# Patient Record
Sex: FEMALE | Race: WHITE | Employment: STUDENT | URBAN - METROPOLITAN AREA
[De-identification: names, ages, dates, MRNs, and addresses within clinical notes are randomized per-mention and may not be internally consistent; named-entity substitution may affect disease eponyms.]

---
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
## 2025-07-10 PROBLEM — L70.0 ACNE VULGARIS: Status: ACTIVE | Noted: 2022-06-27

## 2025-07-10 NOTE — PROGRESS NOTES
Chief Complaint  Patient ID: Harmony Lombardi is a 20 y.o. female who presents for No chief complaint on file..              History of Present Illness  1.  Physical exam.  Immunizations: Tdap last done 2017; UTD on HPV  Patient overall doing well.  She is previously a patient of Dr. Land;s    2. Acne vulgaris  Symptoms are well controlled on spironolactone and Lo Loestrin      Review of Systems  All pertinent positive symptoms are included in the history of present illness.    All other systems have been reviewed and are negative and noncontributory to this patient's current ailments.    Past Medical History  She has a past medical history of Other specified health status.    Surgical History  She has a past surgical history that includes Other surgical history (06/20/2019).     Social History  She reports that she has never smoked. She has never used smokeless tobacco. She reports that she does not drink alcohol and does not use drugs.    Family History[1]  Medications Prior to Visit[2]    Allergies  Patient has no known allergies.    Immunization History   Administered Date(s) Administered    DTaP HepB IPV combined vaccine, pedatric (PEDIARIX) 2005    DTaP, Unspecified 2005, 2005, 11/21/2006, 07/21/2009    Flu vaccine (IIV4), preservative free *Check age/dose* 10/29/2016    Flu vaccine, quadrivalent, no egg protein, age 6 month or greater (FLUCELVAX) 09/19/2020    HPV, Quadrivalent 06/20/2019, 07/22/2019    Hep A, Unspecified 06/06/2013    Hepatitis B vaccine, 19 yrs and under (RECOMBIVAX, ENGERIX) 08/19/2008    Hib (HbOC) 2005, 2005, 2005, 05/20/2006    Influenza, Unspecified 10/20/2010    Influenza, injectable, MDCK, quadrivalent 11/03/2019    Influenza, live, intranasal 11/29/2006    Influenza, seasonal, injectable 09/12/2017, 10/20/2018, 10/11/2021, 10/12/2022    MMR vaccine, subcutaneous (MMR II) 05/20/2006, 07/21/2009    Meningococcal ACWY-D (Menactra) 4-valent conjugate  vaccine 06/07/2017, 06/20/2022    Pfizer Purple Cap SARS-CoV-2 06/07/2021, 06/28/2021    Pneumococcal Conjugate PCV 7 2005, 2005, 02/11/2006, 05/20/2006    Poliovirus vaccine, subcutaneous (IPOL) 2005, 11/21/2006, 07/21/2009    Tdap vaccine, age 7 year and older (BOOSTRIX, ADACEL) 06/07/2017    Varicella vaccine, subcutaneous (VARIVAX) 05/20/2006, 08/19/2008, 07/21/2009     Objective   Visit Vitals  Smoking Status Never        BP Readings from Last 3 Encounters:   07/25/24 116/66   02/20/23 116/60 (71%, Z = 0.55 /  24%, Z = -0.71)*   02/13/23 118/76     *BP percentiles are based on the 2017 AAP Clinical Practice Guideline for girls      Wt Readings from Last 3 Encounters:   07/25/24 61.2 kg (135 lb) (64%, Z= 0.36)*   02/20/23 54 kg (41%, Z= -0.23)*   02/13/23 54 kg (41%, Z= -0.23)*     * Growth percentiles are based on CDC (Girls, 2-20 Years) data.       Relevant Results  No visits with results within 1 Month(s) from this visit.   Latest known visit with results is:   Lab on 07/25/2024   Component Date Value    Hepatitis B Surface AB 07/25/2024 46.4 (H)     T-SPOT. TB Interpretation 07/25/2024 Negative     Panel A Spot Count 07/25/2024 0     Panel B Spot Count 07/25/2024 0     NIL(NEG) Control Spot Co* 07/25/2024 Passed     POS Control Spot Count 07/25/2024 Passed      The ASCVD Risk score (Domingo DK, et al., 2019) failed to calculate for the following reasons:    The 2019 ASCVD risk score is only valid for ages 40 to 79    Physical Exam  CONSTITUTIONAL - well nourished, well developed, looks like stated age, in no acute distress, not ill-appearing, and not tired appearing  SKIN - normal skin color and pigmentation, normal skin turgor without rash, lesions, or nodules visualized  HEAD - no trauma, normocephalic  EYES - pupils are equal and reactive to light, extraocular muscles are intact, and normal external exam  NECK - supple without rigidity, no neck mass was observed, no thyromegaly or thyroid  nodules  CHEST - clear to auscultation, no wheezing, no crackles and no rales, good effort  CARDIAC - regular rate and regular rhythm, no skipped beats, no murmur  ABDOMEN - no organomegaly, soft, nontender, nondistended, normal bowel sound  EXTREMITIES - no obvious or evident edema, no obvious or evident deformities  NEUROLOGICAL - lalert, oriented and no focal signs  PSYCHIATRIC - alert, pleasant and cordial, age-appropriate  IMMUNOLOGIC - no cervical lymphadenopathy    Assessment and Plan  Problem List Items Addressed This Visit    None         [1]   Family History  Problem Relation Name Age of Onset    No Known Problems Mother      No Known Problems Father     [2]   Outpatient Medications Prior to Visit   Medication Sig Dispense Refill    norethindrone-e.estradioL-iron (Lo Loestrin Fe) 1 mg-10 mcg (24)/10 mcg (2) tablet Take 1 tablet by mouth once daily. 28 tablet 11    omeprazole (PriLOSEC) 20 mg DR capsule Take 1 capsule (20 mg) by mouth in the morning and 1 capsule (20 mg) before bedtime.      spironolactone (Aldactone) 50 mg tablet Take 2 tablets (100 mg) by mouth once daily at bedtime.       No facility-administered medications prior to visit.      TB spot ordered for nursing program               [1]   Family History  Problem Relation Name Age of Onset    No Known Problems Mother      No Known Problems Father     [2]   Outpatient Medications Prior to Visit   Medication Sig Dispense Refill    norethindrone-e.estradioL-iron (Lo Loestrin Fe) 1 mg-10 mcg (24)/10 mcg (2) tablet Take 1 tablet by mouth once daily. 28 tablet 11    omeprazole (PriLOSEC) 20 mg DR capsule Take 1 capsule (20 mg) by mouth in the morning and 1 capsule (20 mg) before bedtime.      spironolactone (Aldactone) 50 mg tablet Take 2 tablets (100 mg) by mouth once daily at bedtime.       No facility-administered medications prior to visit.

## 2025-07-14 ENCOUNTER — APPOINTMENT (OUTPATIENT)
Dept: PRIMARY CARE | Facility: CLINIC | Age: 20
End: 2025-07-14
Payer: COMMERCIAL

## 2025-07-14 VITALS
SYSTOLIC BLOOD PRESSURE: 118 MMHG | BODY MASS INDEX: 20.25 KG/M2 | HEART RATE: 62 BPM | HEIGHT: 66 IN | WEIGHT: 126 LBS | DIASTOLIC BLOOD PRESSURE: 68 MMHG

## 2025-07-14 DIAGNOSIS — R74.01 ELEVATED AST (SGOT): ICD-10-CM

## 2025-07-14 DIAGNOSIS — L70.0 ACNE VULGARIS: ICD-10-CM

## 2025-07-14 DIAGNOSIS — Z13.0 SCREENING FOR BLOOD DISEASE: ICD-10-CM

## 2025-07-14 DIAGNOSIS — Z00.00 PHYSICAL EXAM, ANNUAL: Primary | ICD-10-CM

## 2025-07-14 PROBLEM — B07.0 PLANTAR WART: Status: ACTIVE | Noted: 2025-04-23

## 2025-07-14 PROCEDURE — 3008F BODY MASS INDEX DOCD: CPT

## 2025-07-14 PROCEDURE — 99395 PREV VISIT EST AGE 18-39: CPT

## 2025-07-14 PROCEDURE — 1036F TOBACCO NON-USER: CPT

## 2025-07-14 ASSESSMENT — PATIENT HEALTH QUESTIONNAIRE - PHQ9
1. LITTLE INTEREST OR PLEASURE IN DOING THINGS: NOT AT ALL
2. FEELING DOWN, DEPRESSED OR HOPELESS: NOT AT ALL
SUM OF ALL RESPONSES TO PHQ9 QUESTIONS 1 AND 2: 0

## 2025-07-17 LAB
ALBUMIN SERPL-MCNC: 4.7 G/DL (ref 3.6–5.1)
ALP SERPL-CCNC: 52 U/L (ref 31–125)
ALT SERPL-CCNC: 24 U/L (ref 6–29)
ANION GAP SERPL CALCULATED.4IONS-SCNC: 12 MMOL/L (CALC) (ref 7–17)
AST SERPL-CCNC: 34 U/L (ref 10–30)
BASOPHILS # BLD AUTO: 63 CELLS/UL (ref 0–200)
BASOPHILS NFR BLD AUTO: 0.9 %
BILIRUB SERPL-MCNC: 0.7 MG/DL (ref 0.2–1.2)
BUN SERPL-MCNC: 11 MG/DL (ref 7–25)
CALCIUM SERPL-MCNC: 9.6 MG/DL (ref 8.6–10.2)
CHLORIDE SERPL-SCNC: 106 MMOL/L (ref 98–110)
CHOLEST SERPL-MCNC: 159 MG/DL
CHOLEST/HDLC SERPL: 2.6 (CALC)
CO2 SERPL-SCNC: 22 MMOL/L (ref 20–32)
CREAT SERPL-MCNC: 0.85 MG/DL (ref 0.5–0.96)
EGFRCR SERPLBLD CKD-EPI 2021: 101 ML/MIN/1.73M2
EOSINOPHIL # BLD AUTO: 119 CELLS/UL (ref 15–500)
EOSINOPHIL NFR BLD AUTO: 1.7 %
ERYTHROCYTE [DISTWIDTH] IN BLOOD BY AUTOMATED COUNT: 11.8 % (ref 11–15)
GLUCOSE SERPL-MCNC: 84 MG/DL (ref 65–99)
HBV SURFACE AB SERPL IA-ACNC: 45 MIU/ML
HCT VFR BLD AUTO: 44.6 % (ref 35–45)
HDLC SERPL-MCNC: 61 MG/DL
HGB BLD-MCNC: 14.4 G/DL (ref 11.7–15.5)
HIV 1+2 AB+HIV1 P24 AG SERPL QL IA: NORMAL
HIV 1+2 AB+HIV1 P24 AG SERPL QL IA: NORMAL
IGNF NEG CNTRL BLD: NORMAL
LDLC SERPL CALC-MCNC: 81 MG/DL (CALC)
LYMPHOCYTES # BLD AUTO: 2030 CELLS/UL (ref 850–3900)
LYMPHOCYTES NFR BLD AUTO: 29 %
M TB IFN-G BLD-IMP: NEGATIVE
MCH RBC QN AUTO: 31 PG (ref 27–33)
MCHC RBC AUTO-ENTMCNC: 32.3 G/DL (ref 32–36)
MCV RBC AUTO: 96.1 FL (ref 80–100)
MITOGEN IGNF.SPOT COUNT BLD: NORMAL
MONOCYTES # BLD AUTO: 427 CELLS/UL (ref 200–950)
MONOCYTES NFR BLD AUTO: 6.1 %
NEUTROPHILS # BLD AUTO: 4361 CELLS/UL (ref 1500–7800)
NEUTROPHILS NFR BLD AUTO: 62.3 %
NONHDLC SERPL-MCNC: 98 MG/DL (CALC)
PLATELET # BLD AUTO: 230 THOUSAND/UL (ref 140–400)
PMV BLD REES-ECKER: 12.2 FL (ref 7.5–12.5)
POTASSIUM SERPL-SCNC: 4.3 MMOL/L (ref 3.5–5.3)
PROT SERPL-MCNC: 7.2 G/DL (ref 6.1–8.1)
QUEST PANEL A SPOT COUNT: 0
QUEST PANEL B SPOT COUNT: 0
RBC # BLD AUTO: 4.64 MILLION/UL (ref 3.8–5.1)
SODIUM SERPL-SCNC: 140 MMOL/L (ref 135–146)
TRIGL SERPL-MCNC: 83 MG/DL
TSH SERPL-ACNC: 0.94 MIU/L
WBC # BLD AUTO: 7 THOUSAND/UL (ref 3.8–10.8)

## 2025-08-15 DIAGNOSIS — R74.01 ELEVATED AST (SGOT): ICD-10-CM
